# Patient Record
Sex: MALE | Race: WHITE | Employment: FULL TIME | URBAN - NONMETROPOLITAN AREA
[De-identification: names, ages, dates, MRNs, and addresses within clinical notes are randomized per-mention and may not be internally consistent; named-entity substitution may affect disease eponyms.]

---

## 2020-11-04 ENCOUNTER — HOSPITAL ENCOUNTER (EMERGENCY)
Facility: OTHER | Age: 25
Discharge: HOME | End: 2020-11-04
Attending: FAMILY MEDICINE | Admitting: FAMILY MEDICINE
Payer: COMMERCIAL

## 2020-11-04 ENCOUNTER — APPOINTMENT (OUTPATIENT)
Dept: CT IMAGING | Facility: OTHER | Age: 25
End: 2020-11-04
Attending: FAMILY MEDICINE
Payer: COMMERCIAL

## 2020-11-04 ENCOUNTER — OFFICE VISIT (OUTPATIENT)
Dept: FAMILY MEDICINE | Facility: OTHER | Age: 25
End: 2020-11-04
Attending: NURSE PRACTITIONER

## 2020-11-04 VITALS
WEIGHT: 218.8 LBS | BODY MASS INDEX: 32.41 KG/M2 | HEIGHT: 69 IN | HEART RATE: 123 BPM | RESPIRATION RATE: 20 BRPM | TEMPERATURE: 98.3 F | DIASTOLIC BLOOD PRESSURE: 88 MMHG | SYSTOLIC BLOOD PRESSURE: 138 MMHG | OXYGEN SATURATION: 98 %

## 2020-11-04 VITALS
TEMPERATURE: 97.7 F | HEART RATE: 121 BPM | RESPIRATION RATE: 17 BRPM | DIASTOLIC BLOOD PRESSURE: 92 MMHG | SYSTOLIC BLOOD PRESSURE: 149 MMHG | OXYGEN SATURATION: 98 % | HEIGHT: 66 IN | BODY MASS INDEX: 28.93 KG/M2 | WEIGHT: 180 LBS

## 2020-11-04 DIAGNOSIS — R00.2 PALPITATIONS: ICD-10-CM

## 2020-11-04 DIAGNOSIS — R00.0 TACHYCARDIA: Primary | ICD-10-CM

## 2020-11-04 DIAGNOSIS — R06.02 SHORTNESS OF BREATH: ICD-10-CM

## 2020-11-04 LAB
ALBUMIN SERPL-MCNC: 4.8 G/DL (ref 3.5–5.7)
ALP SERPL-CCNC: 67 U/L (ref 34–104)
ALT SERPL W P-5'-P-CCNC: 18 U/L (ref 7–52)
ANION GAP SERPL CALCULATED.3IONS-SCNC: 10 MMOL/L (ref 3–14)
AST SERPL W P-5'-P-CCNC: 16 U/L (ref 13–39)
BASOPHILS # BLD AUTO: 0 10E9/L (ref 0–0.2)
BASOPHILS NFR BLD AUTO: 0.4 %
BILIRUB SERPL-MCNC: 0.4 MG/DL (ref 0.3–1)
BUN SERPL-MCNC: 20 MG/DL (ref 7–25)
CALCIUM SERPL-MCNC: 9.5 MG/DL (ref 8.6–10.3)
CHLORIDE SERPL-SCNC: 103 MMOL/L (ref 98–107)
CO2 SERPL-SCNC: 24 MMOL/L (ref 21–31)
CREAT SERPL-MCNC: 1.13 MG/DL (ref 0.7–1.3)
D DIMER PPP FEU-MCNC: <0.3 UG/ML FEU (ref 0–0.5)
DIFFERENTIAL METHOD BLD: NORMAL
EOSINOPHIL # BLD AUTO: 0 10E9/L (ref 0–0.7)
EOSINOPHIL NFR BLD AUTO: 0.1 %
ERYTHROCYTE [DISTWIDTH] IN BLOOD BY AUTOMATED COUNT: 11.7 % (ref 10–15)
GFR SERPL CREATININE-BSD FRML MDRD: 78 ML/MIN/{1.73_M2}
GLUCOSE SERPL-MCNC: 129 MG/DL (ref 70–105)
HCO3 BLDV-SCNC: 26 MMOL/L (ref 21–28)
HCT VFR BLD AUTO: 45 % (ref 40–53)
HGB BLD-MCNC: 15.8 G/DL (ref 13.3–17.7)
IMM GRANULOCYTES # BLD: 0 10E9/L (ref 0–0.4)
IMM GRANULOCYTES NFR BLD: 0.2 %
INR PPP: 1 (ref 0.86–1.14)
LACTATE BLD-SCNC: 1.1 MMOL/L (ref 0.7–2)
LIPASE SERPL-CCNC: 18 U/L (ref 11–82)
LYMPHOCYTES # BLD AUTO: 0.8 10E9/L (ref 0.8–5.3)
LYMPHOCYTES NFR BLD AUTO: 9.5 %
MCH RBC QN AUTO: 28.7 PG (ref 26.5–33)
MCHC RBC AUTO-ENTMCNC: 35.1 G/DL (ref 31.5–36.5)
MCV RBC AUTO: 82 FL (ref 78–100)
MONOCYTES # BLD AUTO: 0.8 10E9/L (ref 0–1.3)
MONOCYTES NFR BLD AUTO: 9 %
NEUTROPHILS # BLD AUTO: 6.7 10E9/L (ref 1.6–8.3)
NEUTROPHILS NFR BLD AUTO: 80.8 %
O2/TOTAL GAS SETTING VFR VENT: 0 %
OXYHGB MFR BLDV: 79 %
PCO2 BLDV: 43 MM HG (ref 40–50)
PH BLDV: 7.39 PH (ref 7.32–7.43)
PLATELET # BLD AUTO: 287 10E9/L (ref 150–450)
PO2 BLDV: 43 MM HG (ref 25–47)
POTASSIUM SERPL-SCNC: 3.7 MMOL/L (ref 3.5–5.1)
PROT SERPL-MCNC: 8.1 G/DL (ref 6.4–8.9)
RBC # BLD AUTO: 5.5 10E12/L (ref 4.4–5.9)
SODIUM SERPL-SCNC: 137 MMOL/L (ref 134–144)
TROPONIN I SERPL-MCNC: 7.1 PG/ML
TSH SERPL DL<=0.05 MIU/L-ACNC: 1.94 IU/ML (ref 0.34–5.6)
WBC # BLD AUTO: 8.3 10E9/L (ref 4–11)

## 2020-11-04 PROCEDURE — 255N000002 HC RX 255 OP 636: Performed by: FAMILY MEDICINE

## 2020-11-04 PROCEDURE — 82805 BLOOD GASES W/O2 SATURATION: CPT | Performed by: FAMILY MEDICINE

## 2020-11-04 PROCEDURE — 83690 ASSAY OF LIPASE: CPT | Performed by: FAMILY MEDICINE

## 2020-11-04 PROCEDURE — 36415 COLL VENOUS BLD VENIPUNCTURE: CPT | Performed by: FAMILY MEDICINE

## 2020-11-04 PROCEDURE — 85379 FIBRIN DEGRADATION QUANT: CPT | Performed by: FAMILY MEDICINE

## 2020-11-04 PROCEDURE — 85025 COMPLETE CBC W/AUTO DIFF WBC: CPT | Performed by: FAMILY MEDICINE

## 2020-11-04 PROCEDURE — 85610 PROTHROMBIN TIME: CPT | Performed by: FAMILY MEDICINE

## 2020-11-04 PROCEDURE — 84484 ASSAY OF TROPONIN QUANT: CPT | Performed by: FAMILY MEDICINE

## 2020-11-04 PROCEDURE — 99207 PR NO CHARGE LOS: CPT | Performed by: NURSE PRACTITIONER

## 2020-11-04 PROCEDURE — 93000 ELECTROCARDIOGRAM COMPLETE: CPT | Performed by: INTERNAL MEDICINE

## 2020-11-04 PROCEDURE — 99285 EMERGENCY DEPT VISIT HI MDM: CPT | Mod: 25 | Performed by: FAMILY MEDICINE

## 2020-11-04 PROCEDURE — 80053 COMPREHEN METABOLIC PANEL: CPT | Performed by: FAMILY MEDICINE

## 2020-11-04 PROCEDURE — 99283 EMERGENCY DEPT VISIT LOW MDM: CPT | Performed by: FAMILY MEDICINE

## 2020-11-04 PROCEDURE — 84443 ASSAY THYROID STIM HORMONE: CPT | Performed by: FAMILY MEDICINE

## 2020-11-04 PROCEDURE — 83605 ASSAY OF LACTIC ACID: CPT | Performed by: FAMILY MEDICINE

## 2020-11-04 PROCEDURE — C9803 HOPD COVID-19 SPEC COLLECT: HCPCS | Performed by: FAMILY MEDICINE

## 2020-11-04 PROCEDURE — U0003 INFECTIOUS AGENT DETECTION BY NUCLEIC ACID (DNA OR RNA); SEVERE ACUTE RESPIRATORY SYNDROME CORONAVIRUS 2 (SARS-COV-2) (CORONAVIRUS DISEASE [COVID-19]), AMPLIFIED PROBE TECHNIQUE, MAKING USE OF HIGH THROUGHPUT TECHNOLOGIES AS DESCRIBED BY CMS-2020-01-R: HCPCS | Performed by: FAMILY MEDICINE

## 2020-11-04 PROCEDURE — 93005 ELECTROCARDIOGRAM TRACING: CPT | Performed by: FAMILY MEDICINE

## 2020-11-04 PROCEDURE — 250N000013 HC RX MED GY IP 250 OP 250 PS 637: Performed by: NURSE PRACTITIONER

## 2020-11-04 PROCEDURE — 87040 BLOOD CULTURE FOR BACTERIA: CPT | Performed by: FAMILY MEDICINE

## 2020-11-04 PROCEDURE — 71275 CT ANGIOGRAPHY CHEST: CPT

## 2020-11-04 RX ORDER — ALPRAZOLAM 0.5 MG
1 TABLET ORAL ONCE
Status: COMPLETED | OUTPATIENT
Start: 2020-11-04 | End: 2020-11-04

## 2020-11-04 RX ORDER — IODIXANOL 320 MG/ML
100 INJECTION, SOLUTION INTRAVASCULAR ONCE
Status: COMPLETED | OUTPATIENT
Start: 2020-11-04 | End: 2020-11-04

## 2020-11-04 RX ORDER — SODIUM CHLORIDE 9 MG/ML
INJECTION, SOLUTION INTRAVENOUS CONTINUOUS
Status: DISCONTINUED | OUTPATIENT
Start: 2020-11-04 | End: 2020-11-04 | Stop reason: HOSPADM

## 2020-11-04 RX ADMIN — IODIXANOL 100 ML: 320 INJECTION, SOLUTION INTRAVASCULAR at 20:10

## 2020-11-04 RX ADMIN — ALPRAZOLAM 1 MG: 0.5 TABLET ORAL at 16:41

## 2020-11-04 SDOH — HEALTH STABILITY: MENTAL HEALTH: HOW OFTEN DO YOU HAVE 6 OR MORE DRINKS ON ONE OCCASION?: NOT ASKED

## 2020-11-04 SDOH — HEALTH STABILITY: MENTAL HEALTH: HOW OFTEN DO YOU HAVE A DRINK CONTAINING ALCOHOL?: 4 OR MORE TIMES A WEEK

## 2020-11-04 SDOH — HEALTH STABILITY: MENTAL HEALTH: HOW MANY STANDARD DRINKS CONTAINING ALCOHOL DO YOU HAVE ON A TYPICAL DAY?: NOT ASKED

## 2020-11-04 ASSESSMENT — ENCOUNTER SYMPTOMS
FEVER: 0
RHINORRHEA: 0
COUGH: 0
PALPITATIONS: 1
NEUROLOGICAL NEGATIVE: 1
SHORTNESS OF BREATH: 1
PSYCHIATRIC NEGATIVE: 1
FATIGUE: 1
MUSCULOSKELETAL NEGATIVE: 1
EYES NEGATIVE: 1
GASTROINTESTINAL NEGATIVE: 1

## 2020-11-04 ASSESSMENT — PAIN SCALES - GENERAL: PAINLEVEL: NO PAIN (0)

## 2020-11-04 ASSESSMENT — MIFFLIN-ST. JEOR
SCORE: 1957.85
SCORE: 1734.22

## 2020-11-04 NOTE — NURSING NOTE
Chief Complaint   Patient presents with     Cough     Patient presents with cough, shortness of breath starting a couple of hours ago. Patient has not tried anything. Patient has had no fevers. Patient has had no known contact with COVID.  Medication Reconciliation complete.   Radha Pond LPN  ..................11/4/2020   3:27 PM

## 2020-11-04 NOTE — ED TRIAGE NOTES
Pt was at [employer], pt cheked out and he noticed the rapid heart rate and sob. Father drove pt to ER.

## 2020-11-04 NOTE — PROGRESS NOTES
".  HPI:    Tom Shi is a 25 year old male  who presents to [facility] today for shortness of breath. The patient states that he began feeling short of breath about 3 hours ago. The patient states that he had been running errands all morning. The patient states that he was walking out of a store and started feeling short of breath. Denies chest pain, numbness or tingling down bilateral arms. The patient denies any history of anxiety. During today's visit the patient is tachycardic.       No past medical history on file.  No past surgical history on file.  Social History     Tobacco Use     Smoking status: Current Some Day Smoker     Smokeless tobacco: Former User   Substance Use Topics     Alcohol use: Yes     Alcohol/week: 2.0 standard drinks     Types: 2 Shots of liquor per week     Frequency: 4 or more times a week     Current Outpatient Medications   Medication Sig Dispense Refill     omeprazole (PRILOSEC) 20 MG DR capsule Take 20 mg by mouth daily       Allergies   Allergen Reactions     Sulfa Drugs Rash         Past medical history, past surgical history, current medications and allergies reviewed and accurate to the best of my knowledge.        ROS:  Refer to HPI    /88 (BP Location: Left arm, Patient Position: Sitting, Cuff Size: Adult Large)   Pulse 123   Temp 98.3  F (36.8  C) (Tympanic)   Resp 20   Ht 1.753 m (5' 9\")   Wt 99.2 kg (218 lb 12.8 oz)   SpO2 98%   BMI 32.31 kg/m      EXAM:  General Appearance: Anxious appearing adult male, appropriate appearance for age.  Respiratory: normal chest wall and respirations.  Normal effort.  Clear to auscultation bilaterally, no wheezing, crackles or rhonchi.  No increased work of breathing.  No cough appreciated.  Cardiac: RRR with no murmurs  Abdomen: soft, nontender, no rigidity, no rebound tenderness or guarding, normal bowel sounds present  :  No suprapubic tenderness to palpation.  No CVA tenderness to palpation.    Musculoskeletal:  " Equal movement of bilateral upper extremities.  Equal movement of bilateral lower extremities.  Normal gait.    Dermatological: no rashes noted of exposed skin  Psychological: normal affect, alert, oriented, and pleasant.                     ASSESSMENT/PLAN:  1. Shortness of breath      2. Tachycardia    - ALPRAZolam (XANAX) tablet 1 mg      An EKG was performed which showed sinus tachycardia.     The patient was given xanax 1 mg in the clinic to see if his shortness of breath and tachycardia were anxiety related.     30 minutes after the xanax was given the patient's tachycardia of 115-130 beats per minute continued.     The patient was instructed to go to the ED for further evaluation. I spoke with an ED provider regarding the patient's case and that a nurse will be bringing the patient over to the ED.          Discussed warning signs/symptoms indicative of need to f/u    Follow up if symptoms persist or worsen or concerns      I explained my diagnostic considerations and recommendations to the patient, who voiced understanding and agreement with the treatment plan. All questions were answered. We discussed potential side effects of any prescribed or recommended therapies, as well as expectations for response to treatments.    Disclaimer:  This note consists of words and symbols derived from keyboarding, dictation, or using voice recognition software. As a result, there may be errors in the script that have gone undetected. Please consider this when interpreting information found in this note.

## 2020-11-04 NOTE — ED AVS SNAPSHOT
[facility]  [address]  [place] [address]  Phone: [phone]  Fax: [phone]                                    Tom Shi   MRN: [id number]    Department: [facility]   Date of Visit: 11/4/2020           After Visit Summary Signature Page    I have received my discharge instructions, and my questions have been answered. I have discussed any challenges I see with this plan with the nurse or doctor.    ..........................................................................................................................................  Patient/Patient Representative Signature      ..........................................................................................................................................  Patient Representative Print Name and Relationship to Patient    ..................................................               ................................................  Date                                   Time    ..........................................................................................................................................  Reviewed by Signature/Title    ...................................................              ..............................................  Date                                               Time          22EPIC Rev 08/18

## 2020-11-05 NOTE — ED PROVIDER NOTES
History     Chief Complaint   Patient presents with     Shortness of Breath     HPI  Tom Shi is a 25 year old male who was walking around [employer] at 1:30 to 2:00PM this afternoon when he suddenly had shortness of breath and fast heart rate.  This is persisted and is here with his mom for evaluation.  He had been a chewer of tobacco but quit that and now smokes occasional cigars.  He has not felt feverish.  No known contagious contacts.    Allergies:  Allergies   Allergen Reactions     Sulfa Drugs Rash       Problem List:    There are no active problems to display for this patient.       Past Medical History:    History reviewed. No pertinent past medical history.    Past Surgical History:    History reviewed. No pertinent surgical history.    Family History:    Family History   Problem Relation Age of Onset     Hypertension Mother      No Known Problems Father      Coronary Artery Disease Maternal Grandmother      Coronary Artery Disease Maternal Grandfather        Social History:  Marital Status:  Single [1]  Social History     Tobacco Use     Smoking status: Current Some Day Smoker     Smokeless tobacco: Former User   Substance Use Topics     Alcohol use: Yes     Alcohol/week: 3.0 standard drinks     Types: 3 Cans of beer per week     Frequency: 4 or more times a week     Drug use: None        Medications:         omeprazole (PRILOSEC) 20 MG DR capsule          Review of Systems   Constitutional: Positive for fatigue. Negative for fever.   HENT: Negative.  Negative for rhinorrhea.    Eyes: Negative.    Respiratory: Positive for shortness of breath. Negative for cough.    Cardiovascular: Positive for palpitations. Negative for chest pain and leg swelling.   Gastrointestinal: Negative.    Genitourinary: Negative.    Musculoskeletal: Negative.    Skin: Negative.  Negative for rash.   Neurological: Negative.    Psychiatric/Behavioral: Negative.        Physical Exam   BP: 137/89  Pulse: 124  Temp: 97.7  F (36.5  " C)  Resp: 18  Height: 167.6 cm (5' 6\")  Weight: 81.6 kg (180 lb)  SpO2: 99 %      Physical Exam  Vitals signs and nursing note reviewed.   Constitutional:       Appearance: He is well-developed.   HENT:      Head: Normocephalic and atraumatic.      Mouth/Throat:      Mouth: Mucous membranes are moist.   Eyes:      Extraocular Movements: Extraocular movements intact.      Pupils: Pupils are equal, round, and reactive to light.   Neck:      Musculoskeletal: Normal range of motion and neck supple.   Cardiovascular:      Rate and Rhythm: Regular rhythm. Tachycardia present.      Heart sounds: No murmur.   Pulmonary:      Effort: Pulmonary effort is normal.      Breath sounds: Normal breath sounds. No rales.   Chest:      Chest wall: No tenderness.   Abdominal:      General: Bowel sounds are normal.      Palpations: Abdomen is soft.   Musculoskeletal: Normal range of motion.      Right lower leg: He exhibits no tenderness. No edema.      Left lower leg: He exhibits no tenderness. No edema.   Lymphadenopathy:      Cervical: No cervical adenopathy.   Skin:     General: Skin is warm and dry.      Capillary Refill: Capillary refill takes less than 2 seconds.   Neurological:      General: No focal deficit present.      Mental Status: He is alert.   Psychiatric:         Mood and Affect: Mood normal. Mood is not anxious.         Behavior: Behavior normal.         ED Course        Procedures          EKG: Sinus tachycardia at 121 bpm and otherwise normal.    Critical Care time:  none               Results for orders placed or performed during the hospital encounter of 11/04/20 (from the past 24 hour(s))   CBC with platelets differential   Result Value Ref Range    WBC 8.3 4.0 - 11.0 10e9/L    RBC Count 5.50 4.4 - 5.9 10e12/L    Hemoglobin 15.8 13.3 - 17.7 g/dL    Hematocrit 45.0 40.0 - 53.0 %    MCV 82 78 - 100 fl    MCH 28.7 26.5 - 33.0 pg    MCHC 35.1 31.5 - 36.5 g/dL    RDW 11.7 10.0 - 15.0 %    Platelet Count 287 150 - 450 " 10e9/L    Diff Method Automated Method     % Neutrophils 80.8 %    % Lymphocytes 9.5 %    % Monocytes 9.0 %    % Eosinophils 0.1 %    % Basophils 0.4 %    % Immature Granulocytes 0.2 %    Absolute Neutrophil 6.7 1.6 - 8.3 10e9/L    Absolute Lymphocytes 0.8 0.8 - 5.3 10e9/L    Absolute Monocytes 0.8 0.0 - 1.3 10e9/L    Absolute Eosinophils 0.0 0.0 - 0.7 10e9/L    Absolute Basophils 0.0 0.0 - 0.2 10e9/L    Abs Immature Granulocytes 0.0 0 - 0.4 10e9/L   D dimer quantitative   Result Value Ref Range    D Dimer <0.3 0.0 - 0.50 ug/ml FEU   INR   Result Value Ref Range    INR 1.00 0.86 - 1.14   Comprehensive metabolic panel   Result Value Ref Range    Sodium 137 134 - 144 mmol/L    Potassium 3.7 3.5 - 5.1 mmol/L    Chloride 103 98 - 107 mmol/L    Carbon Dioxide 24 21 - 31 mmol/L    Anion Gap 10 3 - 14 mmol/L    Glucose 129 (H) 70 - 105 mg/dL    Urea Nitrogen 20 7 - 25 mg/dL    Creatinine 1.13 0.70 - 1.30 mg/dL    GFR Estimate 78 >60 mL/min/[1.73_m2]    GFR Estimate If Black >90 >60 mL/min/[1.73_m2]    Calcium 9.5 8.6 - 10.3 mg/dL    Bilirubin Total 0.4 0.3 - 1.0 mg/dL    Albumin 4.8 3.5 - 5.7 g/dL    Protein Total 8.1 6.4 - 8.9 g/dL    Alkaline Phosphatase 67 34 - 104 U/L    ALT 18 7 - 52 U/L    AST 16 13 - 39 U/L   Lipase   Result Value Ref Range    Lipase 18 11 - 82 U/L   Troponin GH   Result Value Ref Range    Troponin 7.1 <34.0 pg/mL   Lactic acid whole blood   Result Value Ref Range    Lactic Acid 1.1 0.7 - 2.0 mmol/L   Blood gas venous and oxyhgb   Result Value Ref Range    Ph Venous 7.39 7.32 - 7.43 pH    PCO2 Venous 43 40 - 50 mm Hg    PO2 Venous 43 25 - 47 mm Hg    Bicarbonate Venous 26 21 - 28 mmol/L    FIO2 0     Oxyhemoglobin Venous 79 %   TSH Reflex GH   Result Value Ref Range    TSH Reflex 1.94 0.34 - 5.60 IU/mL   CT Chest Pulmonary Embolism w Contrast    Narrative    CT CHEST PULMONARY EMBOLISM W CONTRAST  11/4/2020 8:09 PM    CLINICAL HISTORY: Male, age 25 years,  PE suspected, high pretest  prob; Acute  tachycardia, SOB at 1:30PM;    Comparison:    TECHNIQUE:  CT was performed of the chest  with IV contrast.   Sagittal, coronal, axial and MIP reconstructions were reviewed.     FINDINGS:  Chest CT:    There is mild groundglass opacification within the right lower lobe  adjacent to the major fissure and minimal groundglass opacification  adjacent to the major fissure in the superior segment of the right  lower lobe. The lungs are otherwise clear.    The heart and great vessels demonstrate no evidence of apparent  pulmonary embolus. The thoracic aorta is intact.    Normal-sized lymph nodes are seen throughout the mediastinum and hilar  regions. There are however number of normal and borderline enlarged  nodes of the left axilla and to a lesser extent right axilla.    There is no evidence of pleural effusion.    Esophagus is normal.    Visualized portions of the upper abdomen demonstrate diffuse decrease  in density of the liver.    Bony structures demonstrate no acute abnormality.    Sternum is intact.      Impression    IMPRESSION:   Good quality examination demonstrates no evidence of acute vascular  abnormality.    There is very slight groundglass opacification adjacent to the  fissures within the right lung as described that appears to be related  to atelectasis.    LINDA LUGO MD       Medications   iodixanol (VISIPAQUE 320) injection 100 mL (100 mLs Intravenous Given 11/4/20 2010)     8:05 PM patient's initial laboratory work-up is benign continues with sinus tachycardia.  I am adding a TSH and I have signed patient out to Dr. Posada at shift change.  Curt Monaco MD   CONTINUATION OF CARE    Patient symptoms resolved upon my reassessment.  Serial cardiopulmonary exams are reassuring.  Labs and chest ct are reassuring.  Does have very slight groundglass opacification however no symptoms of major Covid syndrome at this point.  Return to ED with worsening shortness of breath or chest pain.  Patient  verbalized understanding plan of care is in agreement he left the ED in improved condition.  Assessments & Plan (with Medical Decision Making)     I have reviewed the nursing notes.    I have reviewed the findings, diagnosis, plan and need for follow up with the patient.       Discharge Medication List as of 11/4/2020  8:49 PM          Final diagnoses:   Palpitations       11/4/2020   [facility]        Tramaine Posada MD  11/05/20 0619

## 2020-11-06 ENCOUNTER — TELEPHONE (OUTPATIENT)
Dept: FAMILY MEDICINE | Facility: OTHER | Age: 25
End: 2020-11-06

## 2020-11-06 LAB
SARS-COV-2 RNA SPEC QL NAA+PROBE: NOT DETECTED
SPECIMEN SOURCE: NORMAL

## 2020-11-06 NOTE — TELEPHONE ENCOUNTER
Attempted to contact patient. Left message. Waiting on call back. Melissa Luna RN  ....................  11/6/2020   1:10 PM

## 2020-11-06 NOTE — TELEPHONE ENCOUNTER
After verification of name and date of birth, patient notified of results still pending. Patient verbalizes understanding and has no further questions or concerns. Melissa Luna RN  ....................  11/6/2020   1:26 PM

## 2020-11-10 LAB
BACTERIA SPEC CULT: NORMAL
BACTERIA SPEC CULT: NORMAL
SPECIMEN SOURCE: NORMAL
SPECIMEN SOURCE: NORMAL

## 2020-11-13 LAB — INTERPRETATION ECG - MUSE: NORMAL

## 2020-11-16 ENCOUNTER — OFFICE VISIT (OUTPATIENT)
Dept: FAMILY MEDICINE | Facility: OTHER | Age: 25
End: 2020-11-16
Attending: NURSE PRACTITIONER

## 2020-11-16 ENCOUNTER — HOSPITAL ENCOUNTER (OUTPATIENT)
Dept: GENERAL RADIOLOGY | Facility: OTHER | Age: 25
End: 2020-11-16
Attending: NURSE PRACTITIONER

## 2020-11-16 VITALS
BODY MASS INDEX: 34.93 KG/M2 | OXYGEN SATURATION: 98 % | TEMPERATURE: 97.5 F | HEART RATE: 95 BPM | SYSTOLIC BLOOD PRESSURE: 132 MMHG | RESPIRATION RATE: 18 BRPM | WEIGHT: 216.4 LBS | DIASTOLIC BLOOD PRESSURE: 90 MMHG

## 2020-11-16 DIAGNOSIS — R05.9 COUGH: ICD-10-CM

## 2020-11-16 DIAGNOSIS — R06.02 SHORTNESS OF BREATH: ICD-10-CM

## 2020-11-16 DIAGNOSIS — R05.9 COUGH: Primary | ICD-10-CM

## 2020-11-16 LAB
BASOPHILS # BLD AUTO: 0 10E9/L (ref 0–0.2)
BASOPHILS NFR BLD AUTO: 0.7 %
D DIMER PPP FEU-MCNC: 0.3 UG/ML FEU (ref 0–0.5)
DIFFERENTIAL METHOD BLD: NORMAL
EOSINOPHIL # BLD AUTO: 0.1 10E9/L (ref 0–0.7)
EOSINOPHIL NFR BLD AUTO: 1.5 %
ERYTHROCYTE [DISTWIDTH] IN BLOOD BY AUTOMATED COUNT: 12.2 % (ref 10–15)
HCT VFR BLD AUTO: 48.1 % (ref 40–53)
HGB BLD-MCNC: 16.4 G/DL (ref 13.3–17.7)
IMM GRANULOCYTES # BLD: 0 10E9/L (ref 0–0.4)
IMM GRANULOCYTES NFR BLD: 0.2 %
LYMPHOCYTES # BLD AUTO: 1.3 10E9/L (ref 0.8–5.3)
LYMPHOCYTES NFR BLD AUTO: 32.1 %
MCH RBC QN AUTO: 28.6 PG (ref 26.5–33)
MCHC RBC AUTO-ENTMCNC: 34.1 G/DL (ref 31.5–36.5)
MCV RBC AUTO: 84 FL (ref 78–100)
MONOCYTES # BLD AUTO: 0.5 10E9/L (ref 0–1.3)
MONOCYTES NFR BLD AUTO: 12.3 %
NEUTROPHILS # BLD AUTO: 2.2 10E9/L (ref 1.6–8.3)
NEUTROPHILS NFR BLD AUTO: 53.2 %
PLATELET # BLD AUTO: 320 10E9/L (ref 150–450)
RBC # BLD AUTO: 5.73 10E12/L (ref 4.4–5.9)
WBC # BLD AUTO: 4.1 10E9/L (ref 4–11)

## 2020-11-16 PROCEDURE — C9803 HOPD COVID-19 SPEC COLLECT: HCPCS

## 2020-11-16 PROCEDURE — 71046 X-RAY EXAM CHEST 2 VIEWS: CPT

## 2020-11-16 PROCEDURE — 99214 OFFICE O/P EST MOD 30 MIN: CPT | Performed by: NURSE PRACTITIONER

## 2020-11-16 PROCEDURE — U0003 INFECTIOUS AGENT DETECTION BY NUCLEIC ACID (DNA OR RNA); SEVERE ACUTE RESPIRATORY SYNDROME CORONAVIRUS 2 (SARS-COV-2) (CORONAVIRUS DISEASE [COVID-19]), AMPLIFIED PROBE TECHNIQUE, MAKING USE OF HIGH THROUGHPUT TECHNOLOGIES AS DESCRIBED BY CMS-2020-01-R: HCPCS | Mod: ZL | Performed by: NURSE PRACTITIONER

## 2020-11-16 PROCEDURE — 85025 COMPLETE CBC W/AUTO DIFF WBC: CPT | Mod: ZL | Performed by: NURSE PRACTITIONER

## 2020-11-16 PROCEDURE — 36415 COLL VENOUS BLD VENIPUNCTURE: CPT | Mod: ZL | Performed by: NURSE PRACTITIONER

## 2020-11-16 PROCEDURE — 85379 FIBRIN DEGRADATION QUANT: CPT | Mod: ZL | Performed by: NURSE PRACTITIONER

## 2020-11-16 ASSESSMENT — PAIN SCALES - GENERAL: PAINLEVEL: NO PAIN (0)

## 2020-11-16 NOTE — PROGRESS NOTES
HPI:    Tom Shi is a 25 year old male who presents to [facility] today for a productive cough and continued shortness of breath. The patient was [facility] and then sent to ER on 11/4/20 for shortness of breath and tachycardia. The patient denies having any recent palpitations. The patient's heart rate at today's visit is 95 bpm. The patient states that he developed a cough on the 11/10/20. Patient reports that he has been coughing up clear mucous. Denies fevers or chills. Reports mild congestion in the mornings, which improves throughout the day. The patient states that his shortness of breath gets worse with with exertion, such as hauling wood. The patient states that he has been taking nyquil and dayquil, with minimal improvement in symptoms.        History reviewed. No pertinent past medical history.  History reviewed. No pertinent surgical history.  Social History     Tobacco Use     Smoking status: Current Some Day Smoker     Smokeless tobacco: Former User   Substance Use Topics     Alcohol use: Yes     Alcohol/week: 3.0 standard drinks     Types: 3 Cans of beer per week     Frequency: 4 or more times a week     Current Outpatient Medications   Medication Sig Dispense Refill     omeprazole (PRILOSEC) 20 MG DR capsule Take 20 mg by mouth daily       Allergies   Allergen Reactions     Sulfa Drugs Rash         Past medical history, past surgical history, current medications and allergies reviewed and accurate to the best of my knowledge.        ROS:  Refer to HPI    BP (!) 132/90   Pulse 95   Temp 97.5  F (36.4  C) (Tympanic)   Resp 18   Wt 98.2 kg (216 lb 6.4 oz)   SpO2 98%   BMI 34.93 kg/m      EXAM:  General Appearance: Well appearing male, appropriate appearance for age. No acute distress  Ears: Left TM intact, translucent with bony landmarks appreciated, no erythema, no effusion, no bulging, no purulence.  Right TM intact, translucent with bony landmarks appreciated, no  erythema, no effusion, no bulging, no purulence.  Left auditory canal clear.  Right auditory canal clear.  Normal external ears, non tender.  Eyes: conjunctivae normal without erythema or irritation, corneas clear, no drainage or crusting, no eyelid swelling, pupils equal   Orophayrnx: moist mucous membranes, posterior pharynx without erythema, tonsils without hypertrophy, no erythema, no exudates or petechiae, no post nasal drip seen, no trismus, voice clear.    Sinuses:  No sinus tenderness upon palpation of the frontal or maxillary sinuses  Nose:  Bilateral nares: no erythema, no edema, no drainage or congestion   Neck: supple without adenopathy  Respiratory: normal chest wall and respirations.  Normal effort.  Clear to auscultation bilaterally, no wheezing, crackles or rhonchi.  No increased work of breathing.  No cough appreciated.  Cardiac: RRR with no murmurs  Musculoskeletal:  Equal movement of bilateral upper extremities.  Equal movement of bilateral lower extremities.  Normal gait.    Dermatological: no rashes noted of exposed skin  Psychological: normal affect, alert, oriented, and pleasant.       Labs:  Xray:  Results for orders placed or performed during the hospital encounter of 11/16/20   XR Chest 2 Views     Status: None    Narrative    PROCEDURE: XR CHEST 2 VW 11/16/2020 11:08 AM    HISTORY: Cough    COMPARISONS: None.    TECHNIQUE: 2 views.    FINDINGS: Heart and pulmonary vasculature are normal. Lungs are clear  and no pleural effusion is seen.         Impression    IMPRESSION: No acute disease.    KEYLA LARSON MD   Results for orders placed or performed in visit on 11/16/20   CBC and Differential     Status: None   Result Value Ref Range    WBC 4.1 4.0 - 11.0 10e9/L    RBC Count 5.73 4.4 - 5.9 10e12/L    Hemoglobin 16.4 13.3 - 17.7 g/dL    Hematocrit 48.1 40.0 - 53.0 %    MCV 84 78 - 100 fl    MCH 28.6 26.5 - 33.0 pg    MCHC 34.1 31.5 - 36.5 g/dL    RDW 12.2 10.0 - 15.0 %    Platelet Count  320 150 - 450 10e9/L    Diff Method Automated Method     % Neutrophils 53.2 %    % Lymphocytes 32.1 %    % Monocytes 12.3 %    % Eosinophils 1.5 %    % Basophils 0.7 %    % Immature Granulocytes 0.2 %    Absolute Neutrophil 2.2 1.6 - 8.3 10e9/L    Absolute Lymphocytes 1.3 0.8 - 5.3 10e9/L    Absolute Monocytes 0.5 0.0 - 1.3 10e9/L    Absolute Eosinophils 0.1 0.0 - 0.7 10e9/L    Absolute Basophils 0.0 0.0 - 0.2 10e9/L    Abs Immature Granulocytes 0.0 0 - 0.4 10e9/L   D dimer quantitative     Status: None   Result Value Ref Range    D Dimer 0.3 0.0 - 0.50 ug/ml FEU           ASSESSMENT/PLAN:  1. Cough    - Symptomatic COVID-19 Virus (Coronavirus) by PCR  - CBC and Differential; Future  - XR Chest 2 Views; Future  - CBC and Differential    2. Shortness of breath    - D dimer quantitative; Future  - D dimer quantitative      Discussed with the patient that his chest xray was negative, his d dimer was within normal limits and his CBC was within normal limits.       Informed the patient that at this time, after a collecting a history and physical exam, his symptoms appear to be from a viral source.     Instructed the patient to quarantine until he receives his COVID results.        Symptomatic treatment - Encouraged fluids, salt water gargles, honey, elevation, humidifier, sinus rinse/netti pot, lozenges, etc     May use over-the-counter Tylenol or ibuprofen PRN    Discussed warning signs/symptoms indicative of need to f/u    Follow up if symptoms persist or worsen or concerns      I explained my diagnostic considerations and recommendations to the patient, who voiced understanding and agreement with the treatment plan. All questions were answered. We discussed potential side effects of any prescribed or recommended therapies, as well as expectations for response to treatments.    Disclaimer:  This note consists of words and symbols derived from keyboarding, dictation, or using voice recognition software. As a result,  there may be errors in the script that have gone undetected. Please consider this when interpreting information found in this note.

## 2020-11-16 NOTE — NURSING NOTE
"Chief Complaint   Patient presents with     Cough     Was seen 11/4 and was transferred to ED for palpitation, shortness of breath and tachycardia. He was told in ED to come back in if he develops a cough.  He developed a cough 11/10 he states that his shortness of breath has stayed about the same since he was seen on the 4th.     Initial There were no vitals taken for this visit. Estimated body mass index is 29.05 kg/m  as calculated from the following:    Height as of 11/4/20: 1.676 m (5' 6\").    Weight as of 11/4/20: 81.6 kg (180 lb).    Medication Reconciliation: complete      Fredrick Gleason LPN  "

## 2020-11-16 NOTE — PATIENT INSTRUCTIONS
Patient Education     Understanding the Cold Virus  Colds are the most common illness that people get. Most adults get 2 or 3 colds per year, and most children get 5 to 7 colds per year. Colds may be caused by over 200 types of viruses. The most common of these are rhinoviruses ( rhino  refers to the nose).   What causes a cold virus?  All colds start with infection by a virus. You can be infected by more than 1 cold virus at a time. Colds and flu are respiratory illnesses, but they are caused by different viruses. Infection with cold viruses happens when:     You breathe in a virus from the air. This can happen when someone with a cold sneezes or coughs near you.    You touch your eyes, nose, or mouth when your hand has a cold virus on it. This can happen if you touch an object that has the cold virus on it.  What are the symptoms of a cold virus?  You may wonder if you have a cold or the flu. Compared to the flu, cold symptoms come on more gradually. Almost all colds cause a stuffy nose. Other common cold symptoms include:     Runny nose    Sneezing    Sore throat    Cough    Fatigue (sometimes)    Fever (rare)    Headache (rare)  How is a cold treated?  Colds usually last 5 to 10 days. Treatment focuses on relieving symptoms. Treatments may include:     Decongestant medicines. Several types of decongestants are available without prescription. These may help reduce stuffy or runny nose symptoms.    Prescription or over-the-counter nasal sprays. These may help reduce nasal symptoms, including stuffiness.    Over-the-counter (OTC) pain medicines. These can help with headaches and sore throat.    Self-care. This includes extra rest, using humidifiers, and drinking more fluids. These help you feel better while you are getting over a cold.  Because viruses cause colds, antibiotics do not help. They do not make a cold shorter or relieve symptoms. Taking antibiotics when you don t need them can make them work less well  when you need them for another illness.   Follow all directions for using medicines, especially when giving them to children. Contact your healthcare provider if you have any questions about using cold medicines safely. Before buying cold medicines, make a list of any prescription medicines you take and ask the pharmacist what OTC cold medicines are safe for you to use.   Can a cold be prevented?  You can help reduce the spread of cold viruses. This can help both you and others avoid getting colds. Follow these tips:     Wash your hands well anytime you may have come into contact with cold viruses. Wash your hands for at least 20 seconds. When you can t wash with soap and water, use an alcohol-based hand .    Don t touch your nose, eyes, or mouth, especially after touching something that may have a cold virus on it.    Cover your mouth and nose when you cough or sneeze. Throw away tissues after using them and wash your hands.    Disinfect things you touch often, such as phones and keyboards.      Stay home when you have a cold.     Washing hands often helps reduce the risk of spreading cold viruses.   What are possible complications of a cold virus?   Colds usually go away by themselves. But you may get another type of infection while you have a cold. These can include:     Sinus infection    Lung infection, such as bronchitis or pneumonia    Ear infection  If you have asthma or chronic bronchitis, a cold can make your condition worse.  When should I call my healthcare provider?  Call your healthcare provider right away if you have any of these:    Fever of 100.4 F (38 C) or higher, or as directed by your healthcare provider    Cough, chest pain, or shortness of breath that gets worse    Symptoms don t get better or get worse after about 10 days    Headache, sleepiness, or confusion that gets worse  [employer] last reviewed this educational content on 4/1/2019 2000-2020 [employer] [address]  [place] [address]. All rights reserved. This information is not intended as a substitute for professional medical care. Always follow your healthcare professional's instructions.

## 2020-11-17 LAB
SARS-COV-2 RNA SPEC QL NAA+PROBE: NOT DETECTED
SPECIMEN SOURCE: NORMAL

## 2020-12-27 ENCOUNTER — HEALTH MAINTENANCE LETTER (OUTPATIENT)
Age: 25
End: 2020-12-27

## 2021-10-09 ENCOUNTER — HEALTH MAINTENANCE LETTER (OUTPATIENT)
Age: 26
End: 2021-10-09

## 2022-01-29 ENCOUNTER — HEALTH MAINTENANCE LETTER (OUTPATIENT)
Age: 27
End: 2022-01-29

## 2022-09-17 ENCOUNTER — HEALTH MAINTENANCE LETTER (OUTPATIENT)
Age: 27
End: 2022-09-17

## 2023-05-06 ENCOUNTER — HEALTH MAINTENANCE LETTER (OUTPATIENT)
Age: 28
End: 2023-05-06

## (undated) RX ORDER — SODIUM CHLORIDE 9 MG/ML
INJECTION, SOLUTION INTRAVENOUS
Status: DISPENSED
Start: 2020-11-04